# Patient Record
(demographics unavailable — no encounter records)

---

## 2025-04-28 NOTE — REASON FOR VISIT
[Follow-Up: _____] : a [unfilled] follow-up visit  [Patient] : patient [Mother] : mother [FreeTextEntry1] : type 2 diabetes

## 2025-04-28 NOTE — HISTORY OF PRESENT ILLNESS
[Other: ___] :  blood sugar levels are tested [unfilled] times per day [Irregular Periods] : irregular periods [FreeTextEntry2] : Sandee is a 17 year old female with type 2 diabetes here for follow up. HbA1C at diagnosis was 7.5% (9/2020).   Patient has not been seen since 4/29/24.  She took metformin for ~ 1 month, then stopped due to side effects (stomach pain and diarrhea).  She has not been taking Victoza due to fear of side effects.  Denied fast food and sugary drinks. She has snacks (chips) once per day. She is active in dance once per week.      BLOOD SUGAR PATTERN: does not check blood sugars.   TIMES OF HYPERGLYCEMIA-denies symptoms TIMES OF HYPOGLYCEMIA-denies symptoms PARENTS RESPONSIBILITY IN DIABETES CARE: Parent supervises all care. ACTIVITY: [employer] [facility] [employer]. Goes to dance studio for class one time a week. RECENT HOSPITALIZATION; none ILLNESS; none DIABETES TOPICS DISCUSSED: -Importance of HgbA1c; importance of exercise MENARCHE: Had first menstrual period 07/2022 and then again in 09/2022; LMP-as per mom for past year got patient got her period in May 2024 and December 2024. OTHER:  LABS 06/13/2022; 04/29/2024; 04/2025 DENTIST: 2024; 04/2025 EYE DOCTOR-several years ago-mom reports no issues. PCP-last year sometime  Medications: As per Mom, patient re-started Metformin as instructed at last visit on 04/28/2024 and took it for about a month, however, she stopped it due to diarrhea and nausea. Was missing school because of this. Mom reports trying to make better choices.   [FreeTextEntry1] : Menarche 07/2022; LMP 09/2022; 09/2023; LMP as per mom 05/2024 and 12/28/2024.

## 2025-04-28 NOTE — DATA REVIEWED
[FreeTextEntry1] : (4/28/25)  HbA1C 5.7%, cholesterol 137, LDL Chol 93, HDL Chol 26, TG 93,    TSH 3.75, free T4  1.4,  Albumin/Cr <1.2,  LH 10.8, FSH    7.1, total testosterone   28.3, free testosterone 2.1(H), Androstenedione 159,   prolactin 15.0.              (4/29/24) fasting glucose 112(H), fasting insulin 57(H), HbA1C 5.8%, cholesterol 137, LDL Chol 90, HDL Chol 20, , free T4 1.4, TSH 2.98.   (6/13/22) CBC/CMP WNL, cholesterol 124, LD Chol 74, HDL Chol 24(L), HbA1C 5.4%,  , free T4  1.3, TSH 1.67, Albumin/Cr <1.2/16  (10/27/20) CBC/CMP WNL, Cholesterol 110, LDL Chol 66, HDL Chol 18 (L), , free T4  1.1, TSH 2.28, fasting insulin 37.1, C-peptide 3.7, Islet cell AB <1:4 (Negative), DWAIN-65AB (Negative), IA-2 AB 0 (Negative), Insulin AB <0.4 (Negative)  (9/29/20) TSH 1.02, cholesterol 149, LDL Chol 97, HDL Chol 18,  (H), glucose 135 (H), HbA1C 7.5%, AST/ALT 60/101

## 2025-04-28 NOTE — PHYSICAL EXAM
[Obese] : obese [Acanthosis Nigricans___] : acanthosis nigricans over [unfilled] [Normal Appearance] : normal appearance [Well formed] : well formed [WNL for age] : within normal limits of age [Normal S1 and S2] : normal S1 and S2 [Clear to Ausculation Bilaterally] : clear to auscultation bilaterally [Abdomen Soft] : soft [Abdomen Tenderness] : non-tender [] : no hepatosplenomegaly [Normal] : normal  [Nevi] : no nevi [Pale Striae on Flanks] : no pale striae on flanks [Hirsutism] : hirsutism [Goiter] : no goiter [Murmur] : no murmurs

## 2025-04-28 NOTE — ASSESSMENT
[FreeTextEntry1] : 17-year-old female with exogenous obesity and type 2 diabetes, currently off medications. POCT HbA1C 5.4%-improved. Stable BMI 39.4 kg/m2. Patient has irregular periods, likely due to obesity and functional ovarian hyperandrogenism.   Plan: 1. Trial of metformin ER  500 mg BID      2. Encouraged healthy diet and exercise.